# Patient Record
Sex: FEMALE | Race: BLACK OR AFRICAN AMERICAN
[De-identification: names, ages, dates, MRNs, and addresses within clinical notes are randomized per-mention and may not be internally consistent; named-entity substitution may affect disease eponyms.]

---

## 2018-03-15 ENCOUNTER — HOSPITAL ENCOUNTER (EMERGENCY)
Dept: HOSPITAL 17 - NEPD | Age: 28
Discharge: HOME | End: 2018-03-15
Payer: SELF-PAY

## 2018-03-15 VITALS — BODY MASS INDEX: 35.43 KG/M2 | WEIGHT: 220.46 LBS | HEIGHT: 66 IN

## 2018-03-15 VITALS
OXYGEN SATURATION: 99 % | TEMPERATURE: 98.5 F | DIASTOLIC BLOOD PRESSURE: 72 MMHG | SYSTOLIC BLOOD PRESSURE: 124 MMHG | RESPIRATION RATE: 17 BRPM | HEART RATE: 112 BPM

## 2018-03-15 DIAGNOSIS — L73.9: Primary | ICD-10-CM

## 2018-03-15 PROCEDURE — 99283 EMERGENCY DEPT VISIT LOW MDM: CPT

## 2018-03-15 NOTE — PD
HPI


Chief Complaint:  Skin Problem


Time Seen by Provider:  19:31


Travel History


International Travel<30 days:  No


Contact w/Intl Traveler<30days:  No


Traveled to known affect area:  No





History of Present Illness


HPI


Is a 27-year-old woman presents emergency department complaining of some small 

bumps on her axilla and in her groin.  It been there for about a week to a 

month.  They have gotten better over time.  She never had them before.  No 

other skin infections.  No other complaints.





History


Past Medical History


Medical History:  Denies Significant Hx


:  0





Social History


Alcohol Use:  Yes


Tobacco Use:  No





Allergies-Medications


(Allergen,Severity, Reaction):  


Coded Allergies:  


     penicillin G (Unverified  Allergy, Severe, Rash, 3/15/18)


Reported Meds & Prescriptions





Reported Meds & Active Scripts


Active


Lortab 5/500 Tab (Acetaminophen/Hydrocodone Bitart) 1 Tab Tab 1 Tab PO Q4HPRN 


Tobrex Opth Soln (Tobramycin Sulfate) 0.3 % Soln 1 Drop OS Q4 


Bactrim Ds (Trimethoprim/Sulfamethoxazole)  Tab 1 Tab PO BID 


Reported


No Current Meds (Miscellaneous Medication)  Misc    








Review of Systems


Except as stated in HPI:  all other systems reviewed are Neg





Physical Exam


Narrative


GENERAL: 27-year-old woman, no acute distress.


SKIN: Warm and dry.


CARDIOVASCULAR: Warm and well perfused.


RESPIRATORY: Normal rate and effort.


MUSCULOSKELETAL: Small pustules in the axilla on both sides, as well as a small 

nodule in the left side of the groin.  No adenopathy.


NEUROLOGICAL: Awake and alert.  No gross deficits.





Data


Data


Last Documented VS





Vital Signs








  Date Time  Temp Pulse Resp B/P (MAP) Pulse Ox O2 Delivery O2 Flow Rate FiO2


 


3/15/18 18:39 98.5 112 17 124/72 (89) 99   











MDM


Medical Decision Making


Medical Screen Exam Complete:  Yes


Emergency Medical Condition:  Yes


Differential Diagnosis


Adenopathy, folliculitis, other


Narrative Course


Medical decision making





INITIAL: 27-year-old woman for looks like follicular nodules of folliculitis in 

both axilla that is actually mostly resolved.  Recommend warm compresses.  

Outpatient follow-up.  Antibiotics if they recur.  Return for any worsening 

symptoms.





Diagnosis





 Primary Impression:  


 Folliculitis





***Additional Instructions:  


Take Bactrim if symptoms worsen.





Follow-up with your primary doctor in the next 2-4 days.


***Med/Other Pt SpecificInfo:  Prescription(s) given


Scripts


Sulfamethoxazole-Trimethoprim (Bactrim DS) 800-160 Mg Tab


1 TAB PO BID for Infection, #14 TAB 0 Refills


   Prov: Cj Norton MD         3/15/18


Disposition:  01 DISCHARGE HOME


Condition:  Stable











Cj Norton MD Mar 15, 2018 19:47

## 2019-09-25 ENCOUNTER — HOSPITAL ENCOUNTER (EMERGENCY)
Dept: HOSPITAL 87 - ER | Age: 29
Discharge: HOME | End: 2019-09-25
Payer: MEDICAID

## 2019-09-25 VITALS — WEIGHT: 222.67 LBS | HEIGHT: 66 IN | BODY MASS INDEX: 35.79 KG/M2

## 2019-09-25 VITALS — SYSTOLIC BLOOD PRESSURE: 126 MMHG | DIASTOLIC BLOOD PRESSURE: 89 MMHG

## 2019-09-25 DIAGNOSIS — N93.8: Primary | ICD-10-CM

## 2019-09-25 LAB
APPEARANCE UR: (no result)
BASOPHILS NFR BLD AUTO: 1.1 % (ref 0–2)
CHLORIDE SERPL-SCNC: 105 MEQ/L (ref 98–107)
COLOR UR: YELLOW
EOSINOPHIL NFR BLD AUTO: 1.9 % (ref 0–5)
ERYTHROCYTE [DISTWIDTH] IN BLOOD BY AUTOMATED COUNT: 14.3 % (ref 11.6–14.6)
HCT VFR BLD AUTO: 42.7 % (ref 36–48)
HGB BLD-MCNC: 14.2 G/DL (ref 12–16)
HGB UR QL STRIP: (no result)
KETONES UR STRIP-MCNC: NEGATIVE MG/DL
LEUKOCYTE ESTERASE UR QL STRIP: NEGATIVE
LYMPHOCYTES NFR BLD AUTO: 36.4 % (ref 20–50)
MCH RBC QN AUTO: 28 PG (ref 28–32)
MCV RBC AUTO: 84.5 FL (ref 81–99)
MONOCYTES NFR BLD AUTO: 9.4 % (ref 2–8)
NEUTROPHILS NFR BLD AUTO: 51.2 % (ref 40–76)
NITRITE UR QL STRIP: NEGATIVE
PH UR STRIP: 7 [PH] (ref 4.5–8)
PLATELET # BLD AUTO: 250 X1000/UL (ref 130–400)
PMV BLD AUTO: 8.2 FL (ref 7.4–10.4)
PROT UR QL STRIP: NEGATIVE
RBC # BLD AUTO: 5.05 MILL/UL (ref 4.2–5.4)
SP GR UR STRIP: 1.01 (ref 1–1.03)
UROBILINOGEN UR STRIP-MCNC: 1 E.U./DL (ref 0.2–1)

## 2019-09-25 PROCEDURE — 81003 URINALYSIS AUTO W/O SCOPE: CPT

## 2019-09-25 PROCEDURE — 36415 COLL VENOUS BLD VENIPUNCTURE: CPT

## 2019-09-25 PROCEDURE — 76830 TRANSVAGINAL US NON-OB: CPT

## 2019-09-25 PROCEDURE — 76856 US EXAM PELVIC COMPLETE: CPT

## 2019-09-25 PROCEDURE — 81025 URINE PREGNANCY TEST: CPT

## 2019-09-25 PROCEDURE — 99284 EMERGENCY DEPT VISIT MOD MDM: CPT
